# Patient Record
Sex: MALE | Race: WHITE
[De-identification: names, ages, dates, MRNs, and addresses within clinical notes are randomized per-mention and may not be internally consistent; named-entity substitution may affect disease eponyms.]

---

## 2018-02-09 ENCOUNTER — HOSPITAL ENCOUNTER (EMERGENCY)
Dept: HOSPITAL 5 - ED | Age: 63
Discharge: HOME | End: 2018-02-09
Payer: COMMERCIAL

## 2018-02-09 VITALS — SYSTOLIC BLOOD PRESSURE: 121 MMHG | DIASTOLIC BLOOD PRESSURE: 80 MMHG

## 2018-02-09 DIAGNOSIS — J02.9: Primary | ICD-10-CM

## 2018-02-09 DIAGNOSIS — I95.9: ICD-10-CM

## 2018-02-09 DIAGNOSIS — E11.9: ICD-10-CM

## 2018-02-09 PROCEDURE — 99282 EMERGENCY DEPT VISIT SF MDM: CPT

## 2018-02-09 PROCEDURE — 87116 MYCOBACTERIA CULTURE: CPT

## 2018-02-09 PROCEDURE — 87430 STREP A AG IA: CPT

## 2018-02-09 NOTE — EMERGENCY DEPARTMENT REPORT
Upper Respiratory HPI





- HPI


Chief Complaint: Upper Respiratory Infection


Stated Complaint: FLU LIKE SYMPTOMS


Time Seen by Provider: 02/09/18 10:49


Duration: 1 week 


URI Symptoms: Rhinorrhea: Yes, Sore Throat: Yes, Ear Pain: No, Cough: Yes, 

Shortness of Breath: No, Sick Contacts: Yes, Unable to Take Fluids: No, Urine 

Output Abnormal: No, Listless Behavior: No





- Home Meds and Allergies


Home Medications: 


 Previous Rx's











 Medication  Instructions  Recorded  Last Taken  Type


 


Acetaminophen [Acetaminophen TAB] 1,000 mg PO Q6HR PRN #60 tablet 02/09/18 

Unknown Rx


 


Guaifenesin/Pseudoephedrne HCl 1 tab PO BID PRN #24 tab 02/09/18 Unknown Rx





[Mucinex D ER 1,200-120 mg Tab]    











Allergies/Adverse Reactions: 


 Allergies











Allergy/AdvReac Type Severity Reaction Status Date / Time


 


No Known Allergies Allergy   Unverified 02/09/18 08:57














ED Review of Systems


ROS: 


Stated complaint: FLU LIKE SYMPTOMS


Other details as noted in HPI





Constitutional: see HPI


Eyes: as per HPI


ENT: as per HPI.  denies: ear pain, throat pain, hearing loss, epistaxis, 

congestion


Respiratory: see HPI, cough.  denies: shortness of breath, SOB with exertion, 

SOB at rest, wheezing


Cardiovascular: as per HPI


Endocrine: no symptoms reported


Gastrointestinal: as per HPI


Genitourinary: as per HPI.  denies: urgency, dysuria, frequency


Musculoskeletal: as per HPI


Skin: as per HPI


Neurological: as per HPI


Psychiatric: as per HPI


Hematological/Lymphatic: as per HPI





ED Past Medical Hx





- Past Medical History


Previous Medical History?: Yes


Hx Diabetes: Yes


Additional medical history: Hypotentsion





- Surgical History


Past Surgical History?: No





- Social History


Smoking Status: Never Smoker





- Medications


Home Medications: 


 Home Medications











 Medication  Instructions  Recorded  Confirmed  Last Taken  Type


 


Acetaminophen [Acetaminophen TAB] 1,000 mg PO Q6HR PRN #60 tablet 02/09/18  

Unknown Rx


 


Guaifenesin/Pseudoephedrne HCl 1 tab PO BID PRN #24 tab 02/09/18  Unknown Rx





[Mucinex D ER 1,200-120 mg Tab]     














ED Bronchiolitis Physical Exam





- Exam


General: 


Vital signs noted. No distress. Alert and acting appropriately.





HEENT: Yes Pharyngeal Erythema, Yes Rhinorrhea, No Conjuctival Injection, No 

Dry Mucous Membranes


Ear: Neither TM Bulge, Neither TM Erythema, Neither EAC Discharge


Neck: No Adenopathy, No Rigidity


Lungs: Yes Clear Lung Sounds, Yes Good Air Exchange, Yes Cough, No Wheezes, No 

Stridor, No Nasal Flaring, No Retractions, No Use of Accessory Muscles


Heart: Yes Regular, No Murmur


Abdomen: Yes Normal Bowel Sounds, No Tenderness, No Peritoneal Signs


Skin: No Rash, No Eczema


Neurologic: 


Alert and oriented, no deficits.








Musculoskeletal: 


Unremarkable.











ED Bronchiolitis Tests





- Testing


Testing: Rapid Strep: Normal/Negative





ED Physical Exam





- General


Limitations: No Limitations


General appearance: alert, in no apparent distress





- Head


Head exam: Present: atraumatic, normocephalic





- Eye


Eye exam: Present: normal appearance





- ENT


ENT exam: Present: mucous membranes moist, TM's normal bilaterally, normal 

external ear exam





- Expanded ENT Exam


  ** Expanded


Throat exam: Positive: tonsillar erythema.  Negative: tonsillomegaly, tonsillar 

exudate, R peritonsillar mass, L peritonsillar mass





- Neck


Neck exam: Present: normal inspection, full ROM.  Absent: tenderness, 

meningismus, lymphadenopathy, thyromegaly





- Respiratory


Respiratory exam: Present: normal lung sounds bilaterally.  Absent: respiratory 

distress, wheezes, rhonchi, stridor, chest wall tenderness





- Cardiovascular


Cardiovascular Exam: Present: regular rate, normal rhythm, normal heart sounds.

  Absent: systolic murmur, diastolic murmur, rubs, gallop





- GI/Abdominal


GI/Abdominal exam: Present: soft, normal bowel sounds.  Absent: distended, 

tenderness, guarding, rebound, organomegaly, mass, bruit, pulsatile mass, hernia





- Rectal


Rectal exam: Present: deferred





- Extremities Exam


Extremities exam: Present: normal inspection





- Back Exam


Back exam: Present: normal inspection, full ROM.  Absent: tenderness, CVA 

tenderness (R), CVA tenderness (L)





- Neurological Exam


Neurological exam: Present: alert, oriented X3, CN II-XII intact, normal gait, 

reflexes normal





- Psychiatric


Psychiatric exam: Present: normal affect, normal mood





- Skin


Skin exam: Present: warm, dry, intact, normal color.  Absent: rash





ED Course


 Vital Signs











  02/09/18





  08:55


 


Temperature 98.4 F


 


Pulse Rate 81


 


Respiratory 16





Rate 


 


Blood Pressure 121/80


 


O2 Sat by Pulse 98





Oximetry 














ED Medical Decision Making





- Medical Decision Making


pt is a 61 y/o male health who presents for uri x 1 week no cp no sob no 

wheezing no n/v states cough nonproductive, states some intermittent dizziness 

and headache no symptoms at this time, pt is tolerating po intake without n/v 

at this time v/s noted normal ent: no tm erythema or swelling nose: boggy clear 

post nasal drip no sinus pain pharynx: mild erythema no exudate no lesions no 

stridor uvula midline no swelling, lungs are clear no wheezing, will tx for URI

, Mucinex D bid prn, tylenol po prn qid prn pain fever hydration therapy as 

discussed pt verbalized agreement and understanding of same.





Critical care attestation.: 


If time is entered above; I have spent that time in minutes in the direct care 

of this critically ill patient, excluding procedure time.








ED Disposition


Clinical Impression: 


 Cough





URI (upper respiratory infection)


Qualifiers:


 URI type: acute pharyngitis Pharyngitis/tonsillitis etiology: other specified 

organisms Qualified Code(s): J02.8 - Acute pharyngitis due to other specified 

organisms





Disposition: DC-01 TO HOME OR SELFCARE


Is pt being admited?: No


Does the pt Need Aspirin: No


Condition: Good


Instructions:  Pharyngitis (ED), Upper Respiratory Infection (ED)


Prescriptions: 


Acetaminophen [Acetaminophen TAB] 1,000 mg PO Q6HR PRN #60 tablet


 PRN Reason: pain and fever


Guaifenesin/Pseudoephedrne HCl [Mucinex D ER 1,200-120 mg Tab] 1 tab PO BID PRN 

#24 tab


 PRN Reason: cough congestion 


Referrals: 


LESTER ARAGON MD [Primary Care Provider] - 3-5 Days


Forms:  Work/School Release Form(ED)


Time of Disposition: 11:40

## 2018-02-09 NOTE — EMERGENCY DEPARTMENT REPORT
- General


Chief Complaint: Upper Respiratory Infection


Stated Complaint: FLU LIKE SYMPTOMS


Time Seen by Provider: 02/09/18 10:49


Source: patient


Mode of arrival: Ambulatory


Limitations: No Limitations





- History of Present Illness


Initial Comments: 





fever, coughing dizziness, for one week.


MD Complaint: fever, cough, sore throat, sinus pain


-: Gradual, week(s) (1)


Severity: moderate


Consistency: intermittent


Improves With: nothing


Worsens With: nothing


Context: sick contacts (two kids sick at home)


Associated Symptoms: fever, chills, myalgias, headache, nasal congestion, sore 

throat, cough, chest pain (only with cough), ear pain (sometimes per the patient

, says when air comes out of his ears, he feels dizzy).  denies: abdominal pain

, nausea, vomiting, diarrhea, dysuria, rash, confusion, right sweats, weight 

loss, epistaxis, hoarseness


Treatments Prior to Arrival: Acetaminophen, "cold medicine"





- Related Data


 Allergies











Allergy/AdvReac Type Severity Reaction Status Date / Time


 


No Known Allergies Allergy   Unverified 02/09/18 08:57














ED Review of Systems


ROS: 


Stated complaint: FLU LIKE SYMPTOMS


Other details as noted in HPI





Constitutional: see HPI


Eyes: as per HPI


ENT: as per HPI


Respiratory: see HPI


Cardiovascular: as per HPI


Endocrine: no symptoms reported


Gastrointestinal: as per HPI


Genitourinary: as per HPI


Musculoskeletal: as per HPI


Skin: as per HPI


Neurological: as per HPI


Psychiatric: as per HPI


Hematological/Lymphatic: as per HPI





ED Past Medical Hx





- Past Medical History


Previous Medical History?: Yes


Hx Diabetes: Yes


Additional medical history: Hypotentsion





- Surgical History


Past Surgical History?: No





- Social History


Smoking Status: Never Smoker





ED Physical Exam





- General


Limitations: No Limitations


General appearance: alert, in no apparent distress





- Head


Head exam: Present: atraumatic





- Eye


Eye exam: Present: normal appearance, PERRL, EOMI





- ENT


ENT exam: Present: normal exam, normal orophraynx





- Neck


Neck exam: Present: normal inspection.  Absent: tenderness





- Respiratory


Respiratory exam: Present: normal lung sounds bilaterally.  Absent: respiratory 

distress, wheezes, rales, rhonchi





- Cardiovascular


Cardiovascular Exam: Present: regular rate, normal heart sounds





- GI/Abdominal


GI/Abdominal exam: Present: soft, normal bowel sounds.  Absent: distended, 

tenderness, guarding, rebound, rigid





- Rectal


Rectal exam: Present: deferred





- Extremities Exam


Extremities exam: Present: normal inspection, full ROM





- Back Exam


Back exam: Present: normal inspection, full ROM





- Neurological Exam


Neurological exam: Present: alert, oriented X3, CN II-XII intact





- Psychiatric


Psychiatric exam: Present: normal affect





- Skin


Skin exam: Present: warm, dry, normal color





ED Course





 Vital Signs











  02/09/18





  08:55


 


Temperature 98.4 F


 


Pulse Rate 81


 


Respiratory 16





Rate 


 


Blood Pressure 121/80


 


O2 Sat by Pulse 98





Oximetry 














- Reevaluation(s)


Reevaluation #1: 





02/09/18 10:57


Hand off to fast track for dispo.


Critical care attestation.: 


If time is entered above; I have spent that time in minutes in the direct care 

of this critically ill patient, excluding procedure time.








ED Disposition


Clinical Impression: 


 Cough





Pharyngitis


Qualifiers:


 Pharyngitis/tonsillitis etiology: unspecified etiology Qualified Code(s): 

J02.9 - Acute pharyngitis, unspecified





URI (upper respiratory infection)


Qualifiers:


 URI type: acute pharyngitis Pharyngitis/tonsillitis etiology: other specified 

organisms Qualified Code(s): J02.8 - Acute pharyngitis due to other specified 

organisms





Disposition: DC-01 TO HOME OR SELFCARE


Is pt being admited?: No


Does the pt Need Aspirin: No


Condition: Stable


Instructions:  Pharyngitis (ED)


Referrals: 


LESTER ARAGON MD [Primary Care Provider] - 3-5 Days